# Patient Record
Sex: FEMALE | Race: WHITE | NOT HISPANIC OR LATINO | Employment: OTHER | URBAN - METROPOLITAN AREA
[De-identification: names, ages, dates, MRNs, and addresses within clinical notes are randomized per-mention and may not be internally consistent; named-entity substitution may affect disease eponyms.]

---

## 2017-09-01 ENCOUNTER — HOSPITAL ENCOUNTER (INPATIENT)
Facility: MEDICAL CENTER | Age: 32
LOS: 3 days | DRG: 778 | End: 2017-09-04
Attending: MEDICAL GENETICS | Admitting: MEDICAL GENETICS
Payer: MEDICAID

## 2017-09-01 LAB
ABO GROUP BLD: NORMAL
ALBUMIN SERPL BCP-MCNC: 2.7 G/DL (ref 3.2–4.9)
ALBUMIN/GLOB SERPL: 1.1 G/DL
ALP SERPL-CCNC: 65 U/L (ref 30–99)
ALT SERPL-CCNC: 5 U/L (ref 2–50)
AMPHET UR QL SCN: NEGATIVE
ANION GAP SERPL CALC-SCNC: 8 MMOL/L (ref 0–11.9)
AST SERPL-CCNC: 14 U/L (ref 12–45)
BARBITURATES UR QL SCN: NEGATIVE
BARCODED ABORH UBTYP: 5100
BARCODED ABORH UBTYP: 5100
BARCODED PRD CODE UBPRD: NORMAL
BARCODED PRD CODE UBPRD: NORMAL
BARCODED UNIT NUM UBUNT: NORMAL
BARCODED UNIT NUM UBUNT: NORMAL
BENZODIAZ UR QL SCN: NEGATIVE
BILIRUB SERPL-MCNC: 0.4 MG/DL (ref 0.1–1.5)
BLD GP AB SCN SERPL QL: NORMAL
BUN SERPL-MCNC: 3 MG/DL (ref 8–22)
BZE UR QL SCN: NEGATIVE
CALCIUM SERPL-MCNC: 7.7 MG/DL (ref 8.5–10.5)
CANNABINOIDS UR QL SCN: NEGATIVE
CHLORIDE SERPL-SCNC: 108 MMOL/L (ref 96–112)
CO2 SERPL-SCNC: 21 MMOL/L (ref 20–33)
COMPONENT R 8504R: NORMAL
CREAT SERPL-MCNC: 0.4 MG/DL (ref 0.5–1.4)
ERYTHROCYTE [DISTWIDTH] IN BLOOD BY AUTOMATED COUNT: 42.6 FL (ref 35.9–50)
GFR SERPL CREATININE-BSD FRML MDRD: >60 ML/MIN/1.73 M 2
GLOBULIN SER CALC-MCNC: 2.5 G/DL (ref 1.9–3.5)
GLUCOSE SERPL-MCNC: 86 MG/DL (ref 65–99)
HCT VFR BLD AUTO: 27.5 % (ref 37–47)
HGB BLD-MCNC: 9.2 G/DL (ref 12–16)
MAGNESIUM SERPL-MCNC: 3.5 MG/DL (ref 1.5–2.5)
MCH RBC QN AUTO: 32.2 PG (ref 27–33)
MCHC RBC AUTO-ENTMCNC: 33.5 G/DL (ref 33.6–35)
MCV RBC AUTO: 96.2 FL (ref 81.4–97.8)
METHADONE UR QL SCN: NEGATIVE
OPIATES UR QL SCN: NEGATIVE
OXYCODONE UR QL SCN: NEGATIVE
PCP UR QL SCN: NEGATIVE
PLATELET # BLD AUTO: 145 K/UL (ref 164–446)
PMV BLD AUTO: 11.7 FL (ref 9–12.9)
POTASSIUM SERPL-SCNC: 3.6 MMOL/L (ref 3.6–5.5)
PRODUCT TYPE UPROD: NORMAL
PRODUCT TYPE UPROD: NORMAL
PROPOXYPH UR QL SCN: NEGATIVE
PROT SERPL-MCNC: 5.2 G/DL (ref 6–8.2)
RBC # BLD AUTO: 2.86 M/UL (ref 4.2–5.4)
RH BLD: NORMAL
SODIUM SERPL-SCNC: 137 MMOL/L (ref 135–145)
UNIT STATUS USTAT: NORMAL
UNIT STATUS USTAT: NORMAL
WBC # BLD AUTO: 11 K/UL (ref 4.8–10.8)

## 2017-09-01 PROCEDURE — 80053 COMPREHEN METABOLIC PANEL: CPT

## 2017-09-01 PROCEDURE — 302790 HCHG STAT ANTEPARTUM CARE, DAILY

## 2017-09-01 PROCEDURE — 36415 COLL VENOUS BLD VENIPUNCTURE: CPT

## 2017-09-01 PROCEDURE — A9270 NON-COVERED ITEM OR SERVICE: HCPCS | Performed by: MEDICAL GENETICS

## 2017-09-01 PROCEDURE — 770002 HCHG ROOM/CARE - OB PRIVATE (112)

## 2017-09-01 PROCEDURE — 80307 DRUG TEST PRSMV CHEM ANLYZR: CPT

## 2017-09-01 PROCEDURE — 85027 COMPLETE CBC AUTOMATED: CPT

## 2017-09-01 PROCEDURE — 86850 RBC ANTIBODY SCREEN: CPT

## 2017-09-01 PROCEDURE — 86900 BLOOD TYPING SEROLOGIC ABO: CPT

## 2017-09-01 PROCEDURE — 83735 ASSAY OF MAGNESIUM: CPT

## 2017-09-01 PROCEDURE — 700105 HCHG RX REV CODE 258: Performed by: MEDICAL GENETICS

## 2017-09-01 PROCEDURE — 700102 HCHG RX REV CODE 250 W/ 637 OVERRIDE(OP): Performed by: MEDICAL GENETICS

## 2017-09-01 PROCEDURE — 700111 HCHG RX REV CODE 636 W/ 250 OVERRIDE (IP): Performed by: MEDICAL GENETICS

## 2017-09-01 PROCEDURE — 81001 URINALYSIS AUTO W/SCOPE: CPT

## 2017-09-01 PROCEDURE — 4A1HXCZ MONITORING OF PRODUCTS OF CONCEPTION, CARDIAC RATE, EXTERNAL APPROACH: ICD-10-PCS | Performed by: MEDICAL GENETICS

## 2017-09-01 PROCEDURE — 86901 BLOOD TYPING SEROLOGIC RH(D): CPT

## 2017-09-01 RX ORDER — ACETAMINOPHEN 325 MG/1
650 TABLET ORAL EVERY 4 HOURS PRN
Status: DISCONTINUED | OUTPATIENT
Start: 2017-09-01 | End: 2017-09-04 | Stop reason: HOSPADM

## 2017-09-01 RX ORDER — OXYCODONE AND ACETAMINOPHEN 10; 325 MG/1; MG/1
1 TABLET ORAL EVERY 4 HOURS PRN
Status: DISCONTINUED | OUTPATIENT
Start: 2017-09-01 | End: 2017-09-04 | Stop reason: HOSPADM

## 2017-09-01 RX ORDER — DOCUSATE SODIUM 100 MG/1
100 CAPSULE, LIQUID FILLED ORAL 2 TIMES DAILY PRN
Status: DISCONTINUED | OUTPATIENT
Start: 2017-09-01 | End: 2017-09-04 | Stop reason: HOSPADM

## 2017-09-01 RX ORDER — NICOTINE 21 MG/24HR
1 PATCH, TRANSDERMAL 24 HOURS TRANSDERMAL DAILY
Status: DISCONTINUED | OUTPATIENT
Start: 2017-09-01 | End: 2017-09-04 | Stop reason: HOSPADM

## 2017-09-01 RX ORDER — ONDANSETRON 2 MG/ML
4 INJECTION INTRAMUSCULAR; INTRAVENOUS EVERY 4 HOURS PRN
Status: DISCONTINUED | OUTPATIENT
Start: 2017-09-01 | End: 2017-09-04 | Stop reason: HOSPADM

## 2017-09-01 RX ORDER — SODIUM CHLORIDE, SODIUM LACTATE, POTASSIUM CHLORIDE, CALCIUM CHLORIDE 600; 310; 30; 20 MG/100ML; MG/100ML; MG/100ML; MG/100ML
INJECTION, SOLUTION INTRAVENOUS CONTINUOUS
Status: DISCONTINUED | OUTPATIENT
Start: 2017-09-01 | End: 2017-09-03

## 2017-09-01 RX ORDER — AMPICILLIN 2 G/1
2 INJECTION, POWDER, FOR SOLUTION INTRAVENOUS EVERY 6 HOURS
Status: DISCONTINUED | OUTPATIENT
Start: 2017-09-02 | End: 2017-09-03

## 2017-09-01 RX ORDER — MAGNESIUM SULFATE HEPTAHYDRATE 40 MG/ML
3 INJECTION, SOLUTION INTRAVENOUS CONTINUOUS
Status: DISCONTINUED | OUTPATIENT
Start: 2017-09-01 | End: 2017-09-02

## 2017-09-01 RX ADMIN — NICOTINE 1 PATCH: 21 PATCH, EXTENDED RELEASE TRANSDERMAL at 21:26

## 2017-09-01 RX ADMIN — OXYCODONE HYDROCHLORIDE AND ACETAMINOPHEN 1 TABLET: 10; 325 TABLET ORAL at 21:26

## 2017-09-01 RX ADMIN — SODIUM CHLORIDE, POTASSIUM CHLORIDE, SODIUM LACTATE AND CALCIUM CHLORIDE: 600; 310; 30; 20 INJECTION, SOLUTION INTRAVENOUS at 20:24

## 2017-09-01 RX ADMIN — MAGNESIUM SULFATE IN WATER 3 G/HR: 40 INJECTION, SOLUTION INTRAVENOUS at 20:23

## 2017-09-02 ENCOUNTER — APPOINTMENT (OUTPATIENT)
Dept: RADIOLOGY | Facility: MEDICAL CENTER | Age: 32
DRG: 778 | End: 2017-09-02
Attending: MEDICAL GENETICS
Payer: MEDICAID

## 2017-09-02 LAB
ABO GROUP BLD: NORMAL
APPEARANCE UR: CLEAR
APTT PPP: 27.4 SEC (ref 24.7–36)
BACTERIA #/AREA URNS HPF: NEGATIVE /HPF
BASOPHILS # BLD AUTO: 0.1 % (ref 0–1.8)
BASOPHILS # BLD: 0.01 K/UL (ref 0–0.12)
BILIRUB UR QL STRIP.AUTO: NEGATIVE
COLOR UR: ABNORMAL
EKG IMPRESSION: NORMAL
EOSINOPHIL # BLD AUTO: 0 K/UL (ref 0–0.51)
EOSINOPHIL NFR BLD: 0 % (ref 0–6.9)
EPI CELLS #/AREA URNS HPF: ABNORMAL /HPF
ERYTHROCYTE [DISTWIDTH] IN BLOOD BY AUTOMATED COUNT: 41.5 FL (ref 35.9–50)
GLUCOSE UR STRIP.AUTO-MCNC: NEGATIVE MG/DL
HCT VFR BLD AUTO: 24.7 % (ref 37–47)
HGB BLD-MCNC: 8.6 G/DL (ref 12–16)
HYALINE CASTS #/AREA URNS LPF: ABNORMAL /LPF
IMM GRANULOCYTES # BLD AUTO: 0.07 K/UL (ref 0–0.11)
IMM GRANULOCYTES NFR BLD AUTO: 0.7 % (ref 0–0.9)
INR PPP: 0.99 (ref 0.87–1.13)
KETONES UR STRIP.AUTO-MCNC: 100 MG/DL
LEUKOCYTE ESTERASE UR QL STRIP.AUTO: NEGATIVE
LYMPHOCYTES # BLD AUTO: 0.55 K/UL (ref 1–4.8)
LYMPHOCYTES NFR BLD: 5.4 % (ref 22–41)
MAGNESIUM SERPL-MCNC: 5.2 MG/DL (ref 1.5–2.5)
MCH RBC QN AUTO: 33.1 PG (ref 27–33)
MCHC RBC AUTO-ENTMCNC: 34.8 G/DL (ref 33.6–35)
MCV RBC AUTO: 95 FL (ref 81.4–97.8)
MICRO URNS: ABNORMAL
MONOCYTES # BLD AUTO: 0.43 K/UL (ref 0–0.85)
MONOCYTES NFR BLD AUTO: 4.2 % (ref 0–13.4)
NEUTROPHILS # BLD AUTO: 9.16 K/UL (ref 2–7.15)
NEUTROPHILS NFR BLD: 89.6 % (ref 44–72)
NITRITE UR QL STRIP.AUTO: NEGATIVE
NRBC # BLD AUTO: 0 K/UL
NRBC BLD AUTO-RTO: 0 /100 WBC
PH UR STRIP.AUTO: 6 [PH]
PLATELET # BLD AUTO: 153 K/UL (ref 164–446)
PMV BLD AUTO: 11.4 FL (ref 9–12.9)
PROT UR QL STRIP: NEGATIVE MG/DL
PROTHROMBIN TIME: 13.4 SEC (ref 12–14.6)
RBC # BLD AUTO: 2.6 M/UL (ref 4.2–5.4)
RBC # URNS HPF: ABNORMAL /HPF
RBC UR QL AUTO: ABNORMAL
SP GR UR STRIP.AUTO: 1.01
UROBILINOGEN UR STRIP.AUTO-MCNC: NORMAL MG/DL
WBC # BLD AUTO: 10.2 K/UL (ref 4.8–10.8)
WBC #/AREA URNS HPF: ABNORMAL /HPF

## 2017-09-02 PROCEDURE — 71010 DX-CHEST-LIMITED (1 VIEW): CPT

## 2017-09-02 PROCEDURE — 93010 ELECTROCARDIOGRAM REPORT: CPT | Performed by: INTERNAL MEDICINE

## 2017-09-02 PROCEDURE — 770002 HCHG ROOM/CARE - OB PRIVATE (112)

## 2017-09-02 PROCEDURE — 85025 COMPLETE CBC W/AUTO DIFF WBC: CPT

## 2017-09-02 PROCEDURE — 36415 COLL VENOUS BLD VENIPUNCTURE: CPT

## 2017-09-02 PROCEDURE — 83735 ASSAY OF MAGNESIUM: CPT

## 2017-09-02 PROCEDURE — P9016 RBC LEUKOCYTES REDUCED: HCPCS

## 2017-09-02 PROCEDURE — 93005 ELECTROCARDIOGRAM TRACING: CPT | Performed by: MEDICAL GENETICS

## 2017-09-02 PROCEDURE — 59025 FETAL NON-STRESS TEST: CPT | Performed by: MEDICAL GENETICS

## 2017-09-02 PROCEDURE — 85610 PROTHROMBIN TIME: CPT

## 2017-09-02 PROCEDURE — 700111 HCHG RX REV CODE 636 W/ 250 OVERRIDE (IP): Performed by: MEDICAL GENETICS

## 2017-09-02 PROCEDURE — 700105 HCHG RX REV CODE 258

## 2017-09-02 PROCEDURE — 700102 HCHG RX REV CODE 250 W/ 637 OVERRIDE(OP): Performed by: MEDICAL GENETICS

## 2017-09-02 PROCEDURE — 85730 THROMBOPLASTIN TIME PARTIAL: CPT

## 2017-09-02 PROCEDURE — 302790 HCHG STAT ANTEPARTUM CARE, DAILY

## 2017-09-02 PROCEDURE — 700105 HCHG RX REV CODE 258: Performed by: MEDICAL GENETICS

## 2017-09-02 PROCEDURE — 30233N1 TRANSFUSION OF NONAUTOLOGOUS RED BLOOD CELLS INTO PERIPHERAL VEIN, PERCUTANEOUS APPROACH: ICD-10-PCS | Performed by: MEDICAL GENETICS

## 2017-09-02 PROCEDURE — 36430 TRANSFUSION BLD/BLD COMPNT: CPT

## 2017-09-02 PROCEDURE — A9270 NON-COVERED ITEM OR SERVICE: HCPCS | Performed by: MEDICAL GENETICS

## 2017-09-02 PROCEDURE — 86923 COMPATIBILITY TEST ELECTRIC: CPT | Mod: 91

## 2017-09-02 PROCEDURE — 86644 CMV ANTIBODY: CPT | Mod: 91

## 2017-09-02 RX ORDER — BETAMETHASONE SODIUM PHOSPHATE AND BETAMETHASONE ACETATE 3; 3 MG/ML; MG/ML
12 INJECTION, SUSPENSION INTRA-ARTICULAR; INTRALESIONAL; INTRAMUSCULAR; SOFT TISSUE ONCE
Status: COMPLETED | OUTPATIENT
Start: 2017-09-02 | End: 2017-09-02

## 2017-09-02 RX ORDER — SODIUM CHLORIDE 9 MG/ML
INJECTION, SOLUTION INTRAVENOUS CONTINUOUS
Status: DISCONTINUED | OUTPATIENT
Start: 2017-09-02 | End: 2017-09-03

## 2017-09-02 RX ORDER — DIPHENHYDRAMINE HYDROCHLORIDE 50 MG/ML
25 INJECTION INTRAMUSCULAR; INTRAVENOUS 2 TIMES DAILY PRN
Status: DISCONTINUED | OUTPATIENT
Start: 2017-09-02 | End: 2017-09-04 | Stop reason: HOSPADM

## 2017-09-02 RX ORDER — SODIUM CHLORIDE 9 MG/ML
INJECTION, SOLUTION INTRAVENOUS
Status: COMPLETED
Start: 2017-09-02 | End: 2017-09-02

## 2017-09-02 RX ORDER — LORAZEPAM 1 MG/1
0.5 TABLET ORAL ONCE
Status: COMPLETED | OUTPATIENT
Start: 2017-09-02 | End: 2017-09-02

## 2017-09-02 RX ORDER — LORAZEPAM 2 MG/ML
1 INJECTION INTRAMUSCULAR ONCE
Status: DISCONTINUED | OUTPATIENT
Start: 2017-09-02 | End: 2017-09-02

## 2017-09-02 RX ADMIN — AMPICILLIN SODIUM 2 G: 2 INJECTION, POWDER, FOR SOLUTION INTRAVENOUS at 11:57

## 2017-09-02 RX ADMIN — DIPHENHYDRAMINE HYDROCHLORIDE 25 MG: 50 INJECTION, SOLUTION INTRAMUSCULAR; INTRAVENOUS at 13:48

## 2017-09-02 RX ADMIN — LORAZEPAM 0.5 MG: 1 TABLET ORAL at 19:34

## 2017-09-02 RX ADMIN — AMPICILLIN SODIUM 2 G: 2 INJECTION, POWDER, FOR SOLUTION INTRAVENOUS at 02:34

## 2017-09-02 RX ADMIN — ACETAMINOPHEN 650 MG: 325 TABLET, FILM COATED ORAL at 13:47

## 2017-09-02 RX ADMIN — SODIUM CHLORIDE: 9 INJECTION, SOLUTION INTRAVENOUS at 22:58

## 2017-09-02 RX ADMIN — AMPICILLIN SODIUM 2 G: 2 INJECTION, POWDER, FOR SOLUTION INTRAVENOUS at 17:56

## 2017-09-02 RX ADMIN — BETAMETHASONE SODIUM PHOSPHATE AND BETAMETHASONE ACETATE 12 MG: 3; 3 INJECTION, SUSPENSION INTRA-ARTICULAR; INTRALESIONAL; INTRAMUSCULAR at 19:24

## 2017-09-02 RX ADMIN — ACETAMINOPHEN 650 MG: 325 TABLET, FILM COATED ORAL at 18:21

## 2017-09-02 RX ADMIN — DIPHENHYDRAMINE HYDROCHLORIDE 25 MG: 50 INJECTION, SOLUTION INTRAMUSCULAR; INTRAVENOUS at 22:58

## 2017-09-02 RX ADMIN — OXYCODONE HYDROCHLORIDE AND ACETAMINOPHEN 1 TABLET: 10; 325 TABLET ORAL at 21:24

## 2017-09-02 RX ADMIN — AMPICILLIN SODIUM 2 G: 2 INJECTION, POWDER, FOR SOLUTION INTRAVENOUS at 06:26

## 2017-09-02 RX ADMIN — SODIUM CHLORIDE 1000 ML: 9 INJECTION, SOLUTION INTRAVENOUS at 14:00

## 2017-09-02 RX ADMIN — SODIUM CHLORIDE, POTASSIUM CHLORIDE, SODIUM LACTATE AND CALCIUM CHLORIDE: 600; 310; 30; 20 INJECTION, SOLUTION INTRAVENOUS at 13:23

## 2017-09-02 ASSESSMENT — LIFESTYLE VARIABLES
DO YOU DRINK ALCOHOL: NO
EVER_SMOKED: YES
DO YOU DRINK ALCOHOL: NO
ALCOHOL_USE: NO

## 2017-09-02 ASSESSMENT — PAIN SCALES - GENERAL
PAINLEVEL_OUTOF10: 0

## 2017-09-02 ASSESSMENT — PATIENT HEALTH QUESTIONNAIRE - PHQ9
SUM OF ALL RESPONSES TO PHQ9 QUESTIONS 1 AND 2: 0
1. LITTLE INTEREST OR PLEASURE IN DOING THINGS: NOT AT ALL
2. FEELING DOWN, DEPRESSED, IRRITABLE, OR HOPELESS: NOT AT ALL
SUM OF ALL RESPONSES TO PHQ QUESTIONS 1-9: 0
SUM OF ALL RESPONSES TO PHQ9 QUESTIONS 1 AND 2: 0
SUM OF ALL RESPONSES TO PHQ QUESTIONS 1-9: 0
2. FEELING DOWN, DEPRESSED, IRRITABLE, OR HOPELESS: NOT AT ALL
1. LITTLE INTEREST OR PLEASURE IN DOING THINGS: NOT AT ALL

## 2017-09-02 ASSESSMENT — COPD QUESTIONNAIRES
HAVE YOU SMOKED AT LEAST 100 CIGARETTES IN YOUR ENTIRE LIFE: NO/DON'T KNOW
DO YOU EVER COUGH UP ANY MUCUS OR PHLEGM?: NO/ONLY WITH OCCASIONAL COLDS OR INFECTIONS
COPD SCREENING SCORE: 0
DURING THE PAST 4 WEEKS HOW MUCH DID YOU FEEL SHORT OF BREATH: NONE/LITTLE OF THE TIME

## 2017-09-02 NOTE — PROGRESS NOTES
PAtient seen and evaluated  S: feels better than 24 hours ago (decreased uterine pain)  Occasional contractions only  No vaginal bleed  Good fetal movement  O: AF VSS  80-95/41-52  BPP 8/8  Doppler of UA normal (S/D 2.5)  Doppler of MCA normal: S/D: 39.6 cm/sec (near 1.0 MoM for GA)    Labile hypotension continues with hct now 24.7%  Plt: 153k    Have elected to transfuse 2 units (by protocol)   Decision made for safety issues:  If emergent management (eg delivery) necessary elevated hct would be useful    Have explained management decision to patient who agrees with plan and who understands risks/benefits

## 2017-09-02 NOTE — PROGRESS NOTES
1954- EDC 11/15/2017 39.3 weeks transferred via care flight from [place] for PTL. TOCO/FM applied. Pt's SBP dropped to mid 70's after receiving 6g mag bolus. 3 liters of fluid given during flight. BP's still remain low in 80's.  -Dr. Foley at bedside to see pt. US completed and SVE=1. Magnesium turned down to 2g do to low BP's  -pt in a lot of pain. Medicated with 10mg percocet  -pt feeling much better after pain meds given.  500-Dr. Foley called by RN and updated on pt's low BP's even after receiving 1 1/2 liters bolus through out night per orders. Orders rcvd to DC mag and continue with LR at 125  0600-pt remains comfortably without pain.  0700-report given to dayshift RN

## 2017-09-02 NOTE — PROGRESS NOTES
" [facility] - 11/15/17 EGA - 29.3    0700 - Report received from Josie Hamlin RN. POC discussed, care assumed. Full Assessment complete. VSS. No complaints of contractions, ROM, or vaginal bleeding at this time. Pt reports good FM. POC discussed with pt and family members, all questions answered. Patient on continuous monitoring at this time. Category 1 FHT at this time.  09 Dr. Foley at bedside for status update, orders to remove EFM, continuous toco. EFM removed. Orders for patient to be on a regular diet at this time.  1315 Dr. Foley at bedside for ultrasound. BPP  Orders received to transfuse 2 units PRBCs .   1400 Consents signed for blood transfusion, blood transfusion started, RN at bedside continuously, No reactions for the first 15 minutes. Patients VSS.   1515 Patient sleeping at this time.  1754 Blood transfusion complete at this time. VSS.   1821 Patient feeling short of breath, Dr. Foley contacted, orders received to hold blood.   1850 Patient states she feels \"Like I cant take a deep breath like an elephant is sitting on my chest.\" Dr. Foley updated, orders received for a stat EKG and MD to come to bedside.   Report given to Josie Hamlin RN  190 Dr. Foley at bedside, orders received for a chest x-ray. Orders received.  "

## 2017-09-02 NOTE — PROGRESS NOTES
Patient seen and evaluated  S: quiet nite  Slept well  No headache or dizziness  No to rare contractions  No bleed  O: AF VSS  1213/54-94/51  H/H: 9.2/27.5  Plt: 145k  Urine tox: negative  Coag: normal  FH: 130-140 with rare short variables  A: 29 3/7 weeks  PTL  Stable on no tocolysis (d/c'd with labile hypotension)  CPM

## 2017-09-02 NOTE — CARE PLAN
Problem: Skin Integrity  Goal: Skin Integrity is maintained or improved    Intervention: Davion Flowsheet  Davion assessment preformed q shift.      Problem: Risk for injury  Goal: Fetus will be free of preventable trauma or other complications    Intervention: Monitor FHR electronically, obtaining a reactive NST every shift or as ordered by MD  FHT NST q shift, continuous TOCO

## 2017-09-02 NOTE — H&P
DATE OF ADMISSION:  2017    HISTORY OF PRESENT ILLNESS:  The patient is a 32-year-old  4, para 3,   white female accepted in transport from Dr. Marrero in [place] with a diagnosis   of  labor.  The patient was well until the morning of 2017 when   after her toddler fell on her abdomen she noted abdominal pain.  This became   associated with contractions. The patient denied vaginal bleeding and leaking   of fluid.  The patient presented to [place] for disposition and the patient was   found to be jam, and with a combination of nifedipine and   terbutaline, contractions ceased.  Despite that and because of a cervical   change to 1 cm, the patient was started on magnesium sulfate therapy and was   given ampicillin 2 g IV q. 6 as well as betamethasone administration.    The patient arrived and was placed in room #234.  Iverson catheter was draining   clear urine.    The patient's obstetric history is remarkable for 1  delivery   delivering at approximately 30-32 weeks.  Subsequent pregnancies were full   term and were uncomplicated and were accompanied with the use of [place]   injections, which was not used during this pregnancy for financial reasons.    The patient's pregnancy was unremarkable until this point.    Normal ultrasounds by report.    PAST MEDICAL HISTORY:  The patient denies a history of hypertension, diabetes,   or heart, lung, or kidney disease.    SOCIAL HISTORY:  She admits to smoking half a pack of cigarettes per day.    ALLERGIES:  She was without allergies to medications.    MEDICATIONS:  The patient's only medication was prenatal vitamins.    PAST SURGICAL HISTORY:  Remarkable only for an ovarian cystectomy years ago.    REVIEW OF SYSTEMS:  Noncontributory.    PHYSICAL EXAMINATION:  GENERAL:  Reveals a well-developed, well-nourished white female in no acute   distress.  VITAL SIGNS:  Blood pressure 106/54, respiratory rate of 14.  The patient is    afebrile.  HEENT:  Atraumatic, normocephalic.  Extraocular movements intact.  Pupils   equal, round, reactive to light and accommodation.  NECK:  Supple without thyromegaly or jugular venous distention.  CHEST:  Clear to auscultation and percussion.  CARDIAC:  Regular rate and rhythm.  S1 and S2 heard.  No murmurs or rubs   appreciated.  ABDOMEN:  Soft and nontender.  EXTREMITIES:  Deep tendon reflexes are 2+ and equal bilaterally.  EXTREMITIES:  Without cyanosis, clubbing, and edema.    DIAGNOSTIC STUDIES:  Ultrasound performed by me revealed a single intrauterine   pregnancy in a vertex presentation with an estimated fetal weight of 1337 g   (2 pounds 15 ounces) at the 49th percentile for gestational age.  Amniotic   fluid index was normal with an KAREN of 15.5.  The patient's middle cerebral   artery Doppler was appropriate for gestational age and umbilical artery   Doppler was normal as well.    Ultrasound also revealed a placenta, which was posterior fundal with no   retroplacental or anteroplacental clots.  No suggestion of abruption was made.    [place] shows no contractions and fetal heart rate tracing is reactive.    Biophysical profile  _____.    ASSESSMENT:  Intrauterine pregnancy at a well-dated 29-2/7 weeks with a fetus   appropriate for gestational age and cervical dilatation to 1 cm.  The   diagnosis of  labor is confirmed and the patient will be managed per    labor protocol with special attention and consideration regarding   clotting abnormalities and abruption consideration, although no other   indications appear that way or suggest that diagnosis.  The patient and I had   a discussion regarding management and the patient agrees with this current   management course:  This will be explained to family shortly.       ____________________________________     Allan Foley MD    RNS / NTS    DD:  2017 21:24:37  DT:  2017 21:58:19    D#:  [id number]  Job#:  [id number]    cc:  DOREEN CABRERA MD

## 2017-09-03 LAB
BASOPHILS # BLD AUTO: 0 % (ref 0–1.8)
BASOPHILS # BLD AUTO: 0.2 % (ref 0–1.8)
BASOPHILS # BLD: 0 K/UL (ref 0–0.12)
BASOPHILS # BLD: 0.02 K/UL (ref 0–0.12)
EOSINOPHIL # BLD AUTO: 0 K/UL (ref 0–0.51)
EOSINOPHIL # BLD AUTO: 0 K/UL (ref 0–0.51)
EOSINOPHIL NFR BLD: 0 % (ref 0–6.9)
EOSINOPHIL NFR BLD: 0 % (ref 0–6.9)
ERYTHROCYTE [DISTWIDTH] IN BLOOD BY AUTOMATED COUNT: 49.7 FL (ref 35.9–50)
ERYTHROCYTE [DISTWIDTH] IN BLOOD BY AUTOMATED COUNT: 52.7 FL (ref 35.9–50)
HCT VFR BLD AUTO: 29 % (ref 37–47)
HCT VFR BLD AUTO: 30.2 % (ref 37–47)
HGB BLD-MCNC: 10 G/DL (ref 12–16)
HGB BLD-MCNC: 10 G/DL (ref 12–16)
IMM GRANULOCYTES # BLD AUTO: 0.11 K/UL (ref 0–0.11)
IMM GRANULOCYTES # BLD AUTO: 0.14 K/UL (ref 0–0.11)
IMM GRANULOCYTES NFR BLD AUTO: 1.1 % (ref 0–0.9)
IMM GRANULOCYTES NFR BLD AUTO: 1.3 % (ref 0–0.9)
LYMPHOCYTES # BLD AUTO: 0.55 K/UL (ref 1–4.8)
LYMPHOCYTES # BLD AUTO: 0.79 K/UL (ref 1–4.8)
LYMPHOCYTES NFR BLD: 5.3 % (ref 22–41)
LYMPHOCYTES NFR BLD: 7.7 % (ref 22–41)
MCH RBC QN AUTO: 31.3 PG (ref 27–33)
MCH RBC QN AUTO: 32.1 PG (ref 27–33)
MCHC RBC AUTO-ENTMCNC: 33.1 G/DL (ref 33.6–35)
MCHC RBC AUTO-ENTMCNC: 34.5 G/DL (ref 33.6–35)
MCV RBC AUTO: 92.9 FL (ref 81.4–97.8)
MCV RBC AUTO: 94.7 FL (ref 81.4–97.8)
MONOCYTES # BLD AUTO: 0.37 K/UL (ref 0–0.85)
MONOCYTES # BLD AUTO: 0.5 K/UL (ref 0–0.85)
MONOCYTES NFR BLD AUTO: 3.6 % (ref 0–13.4)
MONOCYTES NFR BLD AUTO: 4.9 % (ref 0–13.4)
NEUTROPHILS # BLD AUTO: 8.87 K/UL (ref 2–7.15)
NEUTROPHILS # BLD AUTO: 9.35 K/UL (ref 2–7.15)
NEUTROPHILS NFR BLD: 86.1 % (ref 44–72)
NEUTROPHILS NFR BLD: 89.8 % (ref 44–72)
NRBC # BLD AUTO: 0 K/UL
NRBC # BLD AUTO: 0 K/UL
NRBC BLD AUTO-RTO: 0 /100 WBC
NRBC BLD AUTO-RTO: 0 /100 WBC
PLATELET # BLD AUTO: 148 K/UL (ref 164–446)
PLATELET # BLD AUTO: 156 K/UL (ref 164–446)
PMV BLD AUTO: 11.6 FL (ref 9–12.9)
PMV BLD AUTO: 12.1 FL (ref 9–12.9)
RBC # BLD AUTO: 3.12 M/UL (ref 4.2–5.4)
RBC # BLD AUTO: 3.19 M/UL (ref 4.2–5.4)
WBC # BLD AUTO: 10.3 K/UL (ref 4.8–10.8)
WBC # BLD AUTO: 10.4 K/UL (ref 4.8–10.8)

## 2017-09-03 PROCEDURE — 36415 COLL VENOUS BLD VENIPUNCTURE: CPT

## 2017-09-03 PROCEDURE — 700105 HCHG RX REV CODE 258: Performed by: MEDICAL GENETICS

## 2017-09-03 PROCEDURE — A9270 NON-COVERED ITEM OR SERVICE: HCPCS | Performed by: MEDICAL GENETICS

## 2017-09-03 PROCEDURE — 700111 HCHG RX REV CODE 636 W/ 250 OVERRIDE (IP): Performed by: MEDICAL GENETICS

## 2017-09-03 PROCEDURE — 770002 HCHG ROOM/CARE - OB PRIVATE (112)

## 2017-09-03 PROCEDURE — 700102 HCHG RX REV CODE 250 W/ 637 OVERRIDE(OP): Performed by: MEDICAL GENETICS

## 2017-09-03 PROCEDURE — 85025 COMPLETE CBC W/AUTO DIFF WBC: CPT

## 2017-09-03 PROCEDURE — 302790 HCHG STAT ANTEPARTUM CARE, DAILY

## 2017-09-03 RX ADMIN — AMPICILLIN SODIUM 2 G: 2 INJECTION, POWDER, FOR SOLUTION INTRAVENOUS at 06:28

## 2017-09-03 RX ADMIN — AMPICILLIN SODIUM 2 G: 2 INJECTION, POWDER, FOR SOLUTION INTRAVENOUS at 01:03

## 2017-09-03 RX ADMIN — NICOTINE 1 PATCH: 21 PATCH, EXTENDED RELEASE TRANSDERMAL at 10:58

## 2017-09-03 RX ADMIN — SODIUM CHLORIDE, POTASSIUM CHLORIDE, SODIUM LACTATE AND CALCIUM CHLORIDE: 600; 310; 30; 20 INJECTION, SOLUTION INTRAVENOUS at 06:28

## 2017-09-03 ASSESSMENT — PAIN SCALES - GENERAL
PAINLEVEL_OUTOF10: 0

## 2017-09-03 NOTE — PROGRESS NOTES
Called to see patient for acute onset shortness of breath    Patient had had 1st unit of blood transfusion almost completed when she noted mild/moderate shortness of breath  RN reported clear lung fields   O2 sat >98% on room air  EKG without acute changes  Urine clear without signs of hemolysis  Confirmation of clear lungs by me    2nd unit held for now  Chest x ray ordered

## 2017-09-03 NOTE — PROGRESS NOTES
"1900-rcvd report from paul RN and assumed care of pt.  Dr. Foley in department to see pt after pt c/o having SOB and \"feeling like an elephant was sitting on her chest\". Stat EKG was completed and a portable chest x-ray ordered.  1915-chest x-ray completed and negative per Dr. Foley. Orders rcvd to give 0.5mg of Ativan to pt PO for anxiety and give the 2nd unit of blood at 8264-0758 if pt continues to feel ok.  2300-2nd blood transfusion started.   0145-blood transfusion complete without incidence. Pt sleeping comfortably at this time. BP's remain low throughout transfusion.  "

## 2017-09-03 NOTE — PROGRESS NOTES
0623- Report received care assumed; patient sleeping in bed - no family at BS; patient is expecting visitors sometime today. Reports good sleep last night, denies contractions/discomfort denies ill feeling, reports frequent FM, no ROM no Bleeding, No change to vision/edema/HA; states she has been getting up to the BR herself without assist denies dizziness or weakness. Gestation today of 29.4  weeks, FM/[place] to be placed after pt wakes up and completes AM care, discussed POC, Cheerful affect/mood, denies questions/concerns at this point, states understanding of POC/expectiations Patient encouraged to call RN with all questions/concerns/needs. RN contact information written on the white information board.

## 2017-09-03 NOTE — CARE PLAN
Problem: Psychosocial needs  Goal: Spiritual needs incorporated in hospitalization    Intervention: Encourage verbalization of feelings/concerns/expectations  Pt encouraged to verbalize feelings and frustrations with all the social stress she has going on outside the hospital.      Problem: Pain  Goal: Alleviation of Pain or a reduction in pain to the patient's comfort goal    Intervention: Pain Management--Medications  Pt medicated for back pain with good relief.

## 2017-09-04 VITALS
OXYGEN SATURATION: 94 % | HEIGHT: 61 IN | HEART RATE: 69 BPM | BODY MASS INDEX: 24.35 KG/M2 | SYSTOLIC BLOOD PRESSURE: 99 MMHG | TEMPERATURE: 97.6 F | DIASTOLIC BLOOD PRESSURE: 54 MMHG | RESPIRATION RATE: 16 BRPM | WEIGHT: 129 LBS

## 2017-09-04 PROBLEM — O60.03 PRETERM LABOR IN THIRD TRIMESTER WITHOUT DELIVERY: Status: ACTIVE | Noted: 2017-09-04

## 2017-09-04 PROCEDURE — A9270 NON-COVERED ITEM OR SERVICE: HCPCS | Performed by: MEDICAL GENETICS

## 2017-09-04 PROCEDURE — 700111 HCHG RX REV CODE 636 W/ 250 OVERRIDE (IP): Performed by: MEDICAL GENETICS

## 2017-09-04 PROCEDURE — 700102 HCHG RX REV CODE 250 W/ 637 OVERRIDE(OP): Performed by: MEDICAL GENETICS

## 2017-09-04 PROCEDURE — 59025 FETAL NON-STRESS TEST: CPT | Performed by: MEDICAL GENETICS

## 2017-09-04 RX ADMIN — NICOTINE 1 PATCH: 21 PATCH, EXTENDED RELEASE TRANSDERMAL at 06:16

## 2017-09-04 RX ADMIN — DIPHENHYDRAMINE HYDROCHLORIDE 25 MG: 50 INJECTION, SOLUTION INTRAMUSCULAR; INTRAVENOUS at 02:00

## 2017-09-04 ASSESSMENT — COPD QUESTIONNAIRES
DO YOU EVER COUGH UP ANY MUCUS OR PHLEGM?: NO/ONLY WITH OCCASIONAL COLDS OR INFECTIONS
COPD SCREENING SCORE: 0
DURING THE PAST 4 WEEKS HOW MUCH DID YOU FEEL SHORT OF BREATH: NONE/LITTLE OF THE TIME
HAVE YOU SMOKED AT LEAST 100 CIGARETTES IN YOUR ENTIRE LIFE: NO/DON'T KNOW

## 2017-09-04 ASSESSMENT — PATIENT HEALTH QUESTIONNAIRE - PHQ9
SUM OF ALL RESPONSES TO PHQ QUESTIONS 1-9: 0
1. LITTLE INTEREST OR PLEASURE IN DOING THINGS: NOT AT ALL
SUM OF ALL RESPONSES TO PHQ9 QUESTIONS 1 AND 2: 0

## 2017-09-04 ASSESSMENT — PAIN SCALES - GENERAL: PAINLEVEL_OUTOF10: 0

## 2017-09-04 ASSESSMENT — LIFESTYLE VARIABLES: DO YOU DRINK ALCOHOL: NO

## 2017-09-04 NOTE — PROGRESS NOTES
Received report and assumed care of patient from OSEI Pereira. Patient is resting comfortably at this time. POC reviewed, questions answered, needs met at this time.  0700 Report given to ULI Olmedo and assumed care of patient.

## 2017-09-04 NOTE — PROGRESS NOTES
Good day  Pt happy to have freedman out  and iv heplocked. Pt steady on feet no dizzyness or cramping. Baby moving well pt settled in for the night  May take a shower if she wants Report given to Noc shift at 1900 pt care to cont.

## 2017-09-04 NOTE — PROGRESS NOTES
0700- Report received from Magy DOWNEY- poc discussed  0730- pt resting no c/o pain, bleeding, LOf, uc's, +FM, pt wondering if she will be able to go home  0915- Dr. Foley by, cecilia ft/50 orders given to discharge home and pt follow up with Dr. Mock this week  0930- nst complete, pt given discharge instructions, pt verbalized understanding waiting for her ride  1015- pt discharged home with family

## 2017-09-04 NOTE — PROGRESS NOTES
Patient seen and evaluated  S: no complaints  No contractions  No bleed  O: AF VSS   /54-55  NST pend  Post transfusion h/h: 10.0/30.2  Plt: 148k  SVE: FT/50%/mid    A: 29 5/7 weeks  Stable post transfusion    OK for discharge  F/U with Dr Mock this week

## 2017-09-04 NOTE — CARE PLAN
Problem: Infection  Goal: Will remain free from infection  Outcome: PROGRESSING AS EXPECTED  Monitor S&S of infection such as fever    Problem: Pain  Goal: Alleviation of Pain or a reduction in pain to the patient's comfort goal  Outcome: PROGRESSING AS EXPECTED  Pain assessment, medication as needed

## 2017-09-04 NOTE — DISCHARGE SUMMARY
DATE OF ADMISSION:  2017    DATE OF DISCHARGE:  2017    The patient was admitted in transport from [facility] with a diagnosis of    labor on 2017.    At the time of admission, the patient was a 32-year-old  4, para 3, who   presented to labor and delivery in [place] with contractions, abdominal pain   and a perception of cervical change.  The patient was seen in [facility]   where a standard tocolysis was begun.  The patient was noted to be anemic and   was transfused 2 units of packed red blood cells without complication.  The   patient was also noted to be in labile hypotension, which largely resolved at   the conclusion of her stay.  The patient's cervical exam at discharge is   fingertip, 50% effaced in mid position.  Patient was given  labor   precautions with explicit directions to decrease activity and remain at pelvic   rest.  The patient was instructed to be seen by Dr. Mock during the next   few days.   labor precautions were reinforced.    CONDITION ON DISCHARGE:  Good.    MEDICATIONS AT DISCHARGE:  None.       ____________________________________     Allan Foley MD    RNS / NTS    DD:  2017 09:10:59  DT:  2017 09:24:42    D#:  [id number]  Job#:  [id number]    cc: DOREEN MOCK MD

## 2017-11-05 ENCOUNTER — HOSPITAL ENCOUNTER (OUTPATIENT)
Facility: MEDICAL CENTER | Age: 32
End: 2017-11-06
Attending: OBSTETRICS & GYNECOLOGY | Admitting: OBSTETRICS & GYNECOLOGY
Payer: MEDICAID

## 2017-11-05 VITALS — HEART RATE: 96 BPM | DIASTOLIC BLOOD PRESSURE: 73 MMHG | SYSTOLIC BLOOD PRESSURE: 113 MMHG

## 2017-11-05 LAB — CRYSTALS AMN MICRO: NORMAL

## 2017-11-05 PROCEDURE — 59025 FETAL NON-STRESS TEST: CPT | Performed by: NURSE PRACTITIONER

## 2017-11-05 PROCEDURE — 86780 TREPONEMA PALLIDUM: CPT

## 2017-11-05 PROCEDURE — 87340 HEPATITIS B SURFACE AG IA: CPT

## 2017-11-05 PROCEDURE — 87653 STREP B DNA AMP PROBE: CPT

## 2017-11-05 PROCEDURE — 85025 COMPLETE CBC W/AUTO DIFF WBC: CPT

## 2017-11-05 PROCEDURE — 86762 RUBELLA ANTIBODY: CPT

## 2017-11-05 PROCEDURE — 87389 HIV-1 AG W/HIV-1&-2 AB AG IA: CPT

## 2017-11-05 PROCEDURE — 86803 HEPATITIS C AB TEST: CPT

## 2017-11-05 PROCEDURE — 36415 COLL VENOUS BLD VENIPUNCTURE: CPT

## 2017-11-05 PROCEDURE — 89060 EXAM SYNOVIAL FLUID CRYSTALS: CPT

## 2017-11-06 LAB
ABO GROUP BLD: NORMAL
BASOPHILS # BLD AUTO: 0.4 % (ref 0–1.8)
BASOPHILS # BLD: 0.05 K/UL (ref 0–0.12)
BLD GP AB SCN SERPL QL: NORMAL
EOSINOPHIL # BLD AUTO: 0.05 K/UL (ref 0–0.51)
EOSINOPHIL NFR BLD: 0.4 % (ref 0–6.9)
ERYTHROCYTE [DISTWIDTH] IN BLOOD BY AUTOMATED COUNT: 44.8 FL (ref 35.9–50)
HBV SURFACE AG SER QL: NEGATIVE
HCT VFR BLD AUTO: 32.9 % (ref 37–47)
HCV AB SER QL: NEGATIVE
HGB BLD-MCNC: 11.2 G/DL (ref 12–16)
HIV 1+2 AB+HIV1 P24 AG SERPL QL IA: NON REACTIVE
IMM GRANULOCYTES # BLD AUTO: 0.1 K/UL (ref 0–0.11)
IMM GRANULOCYTES NFR BLD AUTO: 0.8 % (ref 0–0.9)
LYMPHOCYTES # BLD AUTO: 2.41 K/UL (ref 1–4.8)
LYMPHOCYTES NFR BLD: 19.9 % (ref 22–41)
MCH RBC QN AUTO: 31.6 PG (ref 27–33)
MCHC RBC AUTO-ENTMCNC: 34 G/DL (ref 33.6–35)
MCV RBC AUTO: 92.9 FL (ref 81.4–97.8)
MONOCYTES # BLD AUTO: 0.74 K/UL (ref 0–0.85)
MONOCYTES NFR BLD AUTO: 6.1 % (ref 0–13.4)
NEUTROPHILS # BLD AUTO: 8.76 K/UL (ref 2–7.15)
NEUTROPHILS NFR BLD: 72.4 % (ref 44–72)
NRBC # BLD AUTO: 0 K/UL
NRBC BLD AUTO-RTO: 0 /100 WBC
PLATELET # BLD AUTO: 242 K/UL (ref 164–446)
PMV BLD AUTO: 11.5 FL (ref 9–12.9)
RBC # BLD AUTO: 3.54 M/UL (ref 4.2–5.4)
RH BLD: NORMAL
RUBV AB SER QL: 12.8 IU/ML
TREPONEMA PALLIDUM IGG+IGM AB [PRESENCE] IN SERUM OR PLASMA BY IMMUNOASSAY: NON REACTIVE
WBC # BLD AUTO: 12.1 K/UL (ref 4.8–10.8)

## 2017-11-06 PROCEDURE — 86901 BLOOD TYPING SEROLOGIC RH(D): CPT

## 2017-11-06 PROCEDURE — 86850 RBC ANTIBODY SCREEN: CPT

## 2017-11-06 PROCEDURE — 86900 BLOOD TYPING SEROLOGIC ABO: CPT

## 2017-11-06 NOTE — PROGRESS NOTES
Pt into triage c/o having some irregular sharp pains around her belly and that her back hurts here and there.  Pt reports that sometimes it feels like urine is just leaking out of her or something else, she doesn't really know because none of her other pregnancies were like this.  Monitors placed and pt has no report of bleeding.  POC discussed and pt states understanding at this time.  2110  SVE with fern obtained, no pooling and perineum is dry, dilation noted at this time and discussion for recheck in 1 hour.  2245  Fern returned negative.  Repeat SVE at this time with no cervical change.  Report to CHRISTIN Malhotra and orders for continued monitoring with recheck.  Pt reports that she feels like the UC's are stronger now.  Prenatal labs ordered and GBS obtained at this time.  2345  Repeat SVE with no cervical change made.  Report to CHRISTIN Malhotra and orders for DC received.  DC instructions given and pt states understanding at this time to return when the UC's are getting stronger or if her water breaks.  Pt states understanding and into her regular clothing at this time.  0000  Pt to home with family member.

## 2017-11-06 NOTE — PROGRESS NOTES
S: Pt is a 32 y.o.  at 38w4d with Estimated Date of Delivery: 11/15/17. who presents to triage c/o UC's and ?LOF.  Denies VB.  Reports good FM.      O: /73   Pulse 96          FHTs: baseline 130, pos accels, neg decels, mod variability       [place]: irregular UCs, every 2-8 min       SVE: 4/80/-2, no change after monitoring for >2 hours  Fern was collected and negative. RN saw no gross pooling, negative valsalva.    Last prenatal visit was 17 with her OB in [place], then was transferred to [facility] for evaluation of PTL on 17. Received betamethasone and was subsequently discharged on 17 back to the care of Dr Mock in [place]. She however never followed up, and has had no prenatal care since then.   Prenatal labs were drawn today and GBS collected tonight.  Labor precautions discussed. Encouraged follow up ASAP.         A/P  Patient Active Problem List    Diagnosis Date Noted   •  labor in third trimester without delivery 2017   • Anemia of mother in pregnancy, postpartum condition 2017       1.  IUP @ Unknown  2.  Reactive, category 1 NST.  3.  Early labor, BOW intact.  4.  Lapse in prenatal care  5.  F/u at [facility] for labor or other problems.    Jasmina Malhotra CNM, APRN

## 2017-11-07 LAB — GP B STREP DNA SPEC QL NAA+PROBE: NEGATIVE

## 2018-01-05 ENCOUNTER — OFFICE VISIT (OUTPATIENT)
Dept: URGENT CARE | Facility: PHYSICIAN GROUP | Age: 33
End: 2018-01-05
Payer: MEDICAID

## 2018-01-05 VITALS
TEMPERATURE: 98.8 F | DIASTOLIC BLOOD PRESSURE: 74 MMHG | WEIGHT: 110.6 LBS | OXYGEN SATURATION: 98 % | HEIGHT: 61 IN | SYSTOLIC BLOOD PRESSURE: 112 MMHG | RESPIRATION RATE: 20 BRPM | BODY MASS INDEX: 20.88 KG/M2 | HEART RATE: 133 BPM

## 2018-01-05 DIAGNOSIS — R30.0 DYSURIA: ICD-10-CM

## 2018-01-05 DIAGNOSIS — J02.9 SORE THROAT: ICD-10-CM

## 2018-01-05 DIAGNOSIS — J10.1 INFLUENZA A: ICD-10-CM

## 2018-01-05 DIAGNOSIS — J02.0 STREP THROAT: ICD-10-CM

## 2018-01-05 LAB
APPEARANCE UR: NORMAL
BILIRUB UR STRIP-MCNC: NEGATIVE MG/DL
COLOR UR AUTO: NORMAL
FLUAV+FLUBV AG SPEC QL IA: NORMAL
GLUCOSE UR STRIP.AUTO-MCNC: NEGATIVE MG/DL
INT CON NEG: NORMAL
INT CON NEG: NORMAL
INT CON POS: NORMAL
INT CON POS: NORMAL
KETONES UR STRIP.AUTO-MCNC: NEGATIVE MG/DL
LEUKOCYTE ESTERASE UR QL STRIP.AUTO: NEGATIVE
NITRITE UR QL STRIP.AUTO: NEGATIVE
PH UR STRIP.AUTO: 6 [PH] (ref 5–8)
PROT UR QL STRIP: NORMAL MG/DL
RBC UR QL AUTO: NORMAL
S PYO AG THROAT QL: POSITIVE
SP GR UR STRIP.AUTO: 1.03
UROBILINOGEN UR STRIP-MCNC: NEGATIVE MG/DL

## 2018-01-05 PROCEDURE — 87804 INFLUENZA ASSAY W/OPTIC: CPT | Performed by: EMERGENCY MEDICINE

## 2018-01-05 PROCEDURE — 99203 OFFICE O/P NEW LOW 30 MIN: CPT | Mod: 25 | Performed by: EMERGENCY MEDICINE

## 2018-01-05 PROCEDURE — 87880 STREP A ASSAY W/OPTIC: CPT | Performed by: EMERGENCY MEDICINE

## 2018-01-05 PROCEDURE — 81002 URINALYSIS NONAUTO W/O SCOPE: CPT | Performed by: EMERGENCY MEDICINE

## 2018-01-05 RX ORDER — AMOXICILLIN 500 MG/1
500 CAPSULE ORAL 2 TIMES DAILY
Qty: 20 CAP | Refills: 0 | Status: SHIPPED | OUTPATIENT
Start: 2018-01-05 | End: 2018-01-15

## 2018-01-05 RX ORDER — OSELTAMIVIR PHOSPHATE 75 MG/1
75 CAPSULE ORAL 2 TIMES DAILY
Qty: 10 CAP | Refills: 0 | Status: SHIPPED | OUTPATIENT
Start: 2018-01-05 | End: 2023-09-15

## 2018-01-05 ASSESSMENT — ENCOUNTER SYMPTOMS
FLANK PAIN: 0
ABDOMINAL PAIN: 0
SORE THROAT: 1
VOMITING: 0
NAUSEA: 0
DIARRHEA: 0
RHINORRHEA: 0
SENSORY CHANGE: 0
COUGH: 1
SINUS PAIN: 1
FOCAL WEAKNESS: 0
WHEEZING: 0
NECK PAIN: 0
CHILLS: 1
HEADACHES: 1

## 2018-01-05 NOTE — PROGRESS NOTES
"Subjective:      Stephanie Fontaine is a 32 y.o. female who presents with Pharyngitis (x 2 days)            URI    This is a new problem. Episode onset: 2 days. The problem has been gradually worsening. Maximum temperature: subjective. Associated symptoms include congestion, coughing, dysuria, headaches, sinus pain and a sore throat. Pertinent negatives include no abdominal pain, chest pain, diarrhea, ear pain, nausea, neck pain, plugged ear sensation, rash, rhinorrhea, vomiting or wheezing. Treatments tried: Mucinex. The treatment provided no relief.   Postpartum one month, delivery complicated by delayed placental delivery. Not breast-feeding.  Brief episode of dysuria yesterday.    Review of Systems   Constitutional: Positive for chills and malaise/fatigue.   HENT: Positive for congestion, sinus pain and sore throat. Negative for ear pain, nosebleeds and rhinorrhea.    Respiratory: Positive for cough. Negative for wheezing.    Cardiovascular: Negative for chest pain.   Gastrointestinal: Negative for abdominal pain, diarrhea, nausea and vomiting.   Genitourinary: Positive for dysuria. Negative for flank pain, frequency, hematuria and urgency.        No vaginal discharge; continued minimal vaginal bleeding.   Musculoskeletal: Negative for neck pain.   Skin: Negative for rash.   Neurological: Positive for headaches. Negative for sensory change and focal weakness.     PMH:  has no past medical history on file.  MEDS: No current outpatient prescriptions on file.  ALLERGIES: No Known Allergies  SURGHX: History reviewed. No pertinent surgical history.  SOCHX:  reports that she has been smoking Cigarettes.  She has been smoking about 0.50 packs per day. She has never used smokeless tobacco. She reports that she does not drink alcohol or use drugs.  FH: family history is not on file.       Objective:     /74   Pulse (!) 133   Temp 37.1 °C (98.8 °F)   Resp 20   Ht 1.549 m (5' 1\")   Wt 50.2 kg (110 lb 9.6 oz)   SpO2 " 98%   BMI 20.90 kg/m²      Physical Exam   Constitutional: She is oriented to person, place, and time. She appears well-developed and well-nourished. She is cooperative.  Non-toxic appearance. She does not have a sickly appearance. No distress.   HENT:   Right Ear: Tympanic membrane and ear canal normal.   Left Ear: Tympanic membrane and ear canal normal.   Nose: Mucosal edema present. No rhinorrhea.   Mouth/Throat: Uvula is midline. No trismus in the jaw. No uvula swelling. Posterior oropharyngeal erythema present. No oropharyngeal exudate or posterior oropharyngeal edema. Tonsils are 2+ on the right. Tonsils are 2+ on the left. Tonsillar exudate.   Eyes: Conjunctivae are normal.   Neck: Trachea normal and phonation normal. Neck supple.   Cardiovascular: Regular rhythm and normal heart sounds.  Tachycardia present.    No murmur heard.  Pulmonary/Chest: Effort normal and breath sounds normal.   Abdominal: She exhibits no distension.   Lymphadenopathy:     She has cervical adenopathy.        Right cervical: Superficial cervical adenopathy present. No posterior cervical adenopathy present.       Left cervical: Superficial cervical adenopathy present. No posterior cervical adenopathy present.   Neurological: She is alert and oriented to person, place, and time.   Skin: Skin is warm and dry.   Psychiatric: She has a normal mood and affect.          Advised mother of availability of Tamiflu, risks versus benefits; patient prefers to take it.     Assessment/Plan:     1. Strep throat  Rx amoxicillin  Recommended supportive care measures, including rest, increasing oral fluid intake and use of over-the-counter medications for relief of symptoms.  Advised smoking cessation or avoidance.  2. Influenza A  Positive A- POCT Influenza A/B  Rx [place]  3. Sore throat  positive- POCT Rapid Strep A  4. Dysuria  Positive blood, SG 1.030 - POCT Urinalysis  Encouraged increased oral fluid intake

## 2019-08-20 NOTE — PROGRESS NOTES
-- Message is from the [facility]--    Reason for Call: Patent father called a states patient was treated by doctor regarding patient geel states still hasn't cleared up . Please call to discuss next step    Caller Information       Type Contact Phone    08/20/2019 01:01 PM Phone (Incoming) LEONELA JEAN (Father) [phone]          Alternative phone number:     Turnaround time given to caller:   \"This message will be sent to [state Provider's name]. The clinical team will fulfill your request as soon as they review your message.\"     Patient seen and evaluated  S: Sleeping  quiet nite per RN  Slept well  No headache or dizziness  No to rare contractions  No bleed  O: AF VSS  105/47 pulse 64  H/H: 10.0/29.0 post transfusion  Plt: 156k    NST pend  A: 29 4/7 weeks  PTL  Stable on no tocolysis   CPM

## 2020-04-04 ENCOUNTER — OFFICE VISIT (OUTPATIENT)
Dept: URGENT CARE | Facility: PHYSICIAN GROUP | Age: 35
End: 2020-04-04
Payer: MEDICAID

## 2020-04-04 VITALS
OXYGEN SATURATION: 97 % | TEMPERATURE: 97.7 F | WEIGHT: 97 LBS | RESPIRATION RATE: 16 BRPM | DIASTOLIC BLOOD PRESSURE: 62 MMHG | SYSTOLIC BLOOD PRESSURE: 106 MMHG | BODY MASS INDEX: 18.33 KG/M2 | HEART RATE: 102 BPM

## 2020-04-04 DIAGNOSIS — R30.0 DYSURIA: ICD-10-CM

## 2020-04-04 DIAGNOSIS — N30.01 ACUTE CYSTITIS WITH HEMATURIA: ICD-10-CM

## 2020-04-04 LAB
APPEARANCE UR: NORMAL
BILIRUB UR STRIP-MCNC: NORMAL MG/DL
COLOR UR AUTO: NORMAL
GLUCOSE UR STRIP.AUTO-MCNC: NORMAL MG/DL
INT CON NEG: NORMAL
INT CON POS: NORMAL
KETONES UR STRIP.AUTO-MCNC: NORMAL MG/DL
LEUKOCYTE ESTERASE UR QL STRIP.AUTO: NORMAL
NITRITE UR QL STRIP.AUTO: NORMAL
PH UR STRIP.AUTO: 5 [PH] (ref 5–8)
POC URINE PREGNANCY TEST: NEGATIVE
PROT UR QL STRIP: NORMAL MG/DL
RBC UR QL AUTO: NORMAL
SP GR UR STRIP.AUTO: 1.02
UROBILINOGEN UR STRIP-MCNC: 4 MG/DL

## 2020-04-04 PROCEDURE — 99214 OFFICE O/P EST MOD 30 MIN: CPT | Mod: 25 | Performed by: PHYSICIAN ASSISTANT

## 2020-04-04 PROCEDURE — 81025 URINE PREGNANCY TEST: CPT | Performed by: PHYSICIAN ASSISTANT

## 2020-04-04 PROCEDURE — 81002 URINALYSIS NONAUTO W/O SCOPE: CPT | Performed by: PHYSICIAN ASSISTANT

## 2020-04-04 RX ORDER — SULFAMETHOXAZOLE AND TRIMETHOPRIM 800; 160 MG/1; MG/1
1 TABLET ORAL EVERY 12 HOURS
Qty: 28 TAB | Refills: 0 | Status: CANCELLED | OUTPATIENT
Start: 2020-04-04 | End: 2020-04-18

## 2020-04-04 RX ORDER — ONDANSETRON 4 MG/1
4 TABLET, FILM COATED ORAL EVERY 6 HOURS PRN
Qty: 20 TAB | Refills: 1 | Status: SHIPPED | OUTPATIENT
Start: 2020-04-04 | End: 2023-09-15

## 2020-04-04 RX ORDER — SULFAMETHOXAZOLE AND TRIMETHOPRIM 800; 160 MG/1; MG/1
1 TABLET ORAL EVERY 12 HOURS
Qty: 14 TAB | Refills: 0 | Status: SHIPPED | OUTPATIENT
Start: 2020-04-04 | End: 2020-04-11

## 2020-04-04 ASSESSMENT — ENCOUNTER SYMPTOMS
COUGH: 0
ABDOMINAL PAIN: 1
FLANK PAIN: 0
DIARRHEA: 0
CHILLS: 0
NAUSEA: 0
VOMITING: 0
FEVER: 0

## 2020-04-04 NOTE — PROGRESS NOTES
Subjective:   Stephanie Fontaine  is a 35 y.o. female who presents for Painful Urination (burning x2d)        Other   This is a new problem. The current episode started yesterday. The problem occurs constantly. Associated symptoms include abdominal pain ( Suprapubic cramping). Pertinent negatives include no chills, coughing, fever, nausea, rash or vomiting.     Patient was clinic complaining of dysuria frequency urgency and hematuria.  Denies fevers chills but complains of body aches.  Complains of abdominal cramping but denies back pain.  Denies any past medical history of cystitis or pyelonephritis.  Denies abnormal vaginal discharge.  Notes she did have intercourse just prior to onset of symptoms and suspects involved.  Complains of nausea without vomiting.    Review of Systems   Constitutional: Negative for chills and fever.   Respiratory: Negative for cough.    Gastrointestinal: Positive for abdominal pain ( Suprapubic cramping). Negative for diarrhea, nausea and vomiting.   Genitourinary: Positive for dysuria, frequency, hematuria and urgency. Negative for flank pain.   Skin: Negative for rash.     No Known Allergies   I have worn an N95 mask, gloves and eye protection for the entire encounter with this patient.    Objective:   /62   Pulse (!) 102   Temp 36.5 °C (97.7 °F) (Temporal)   Resp 16   Wt 44 kg (97 lb)   SpO2 97%   BMI 18.33 kg/m²   Physical Exam  Vitals signs and nursing note reviewed.   Constitutional:       General: She is not in acute distress.     Appearance: Normal appearance. She is well-developed. She is not diaphoretic.   HENT:      Head: Normocephalic and atraumatic.      Right Ear: External ear normal.      Left Ear: External ear normal.      Nose: Nose normal.   Eyes:      General: Lids are normal. No scleral icterus.        Right eye: No discharge.         Left eye: No discharge.      Conjunctiva/sclera: Conjunctivae normal.   Neck:      Musculoskeletal: Neck supple.   Pulmonary:       Effort: Pulmonary effort is normal. No respiratory distress.   Abdominal:      Palpations: Abdomen is soft. Abdomen is not rigid.      Tenderness: There is abdominal tenderness ( very mild suprapubic) in the suprapubic area. There is no guarding.   Musculoskeletal: Normal range of motion.   Skin:     General: Skin is warm and dry.      Coloration: Skin is not pale.      Findings: No erythema.   Neurological:      Mental Status: She is alert and oriented to person, place, and time. She is not disoriented.   Psychiatric:         Speech: Speech normal.         Behavior: Behavior normal.     POCT UA -+++        Rocephin 250 IM -tolerates well    Assessment/Plan:   1. Acute cystitis with hematuria  - POCT Urinalysis  - POCT PREGNANCY  - cefTRIAXone (ROCEPHIN) 250 mg, lidocaine (XYLOCAINE) 1 % 0.9 mL for IM use  - ondansetron (ZOFRAN) 4 MG Tab tablet; Take 1 Tab by mouth every 6 hours as needed for Nausea/Vomiting.  Dispense: 20 Tab; Refill: 1  - sulfamethoxazole-trimethoprim (BACTRIM DS) 800-160 MG tablet; Take 1 Tab by mouth every 12 hours for 7 days.  Dispense: 14 Tab; Refill: 0    2. Dysuria  Supportive care is reviewed with patient/caregiver - recommend to push PO fluids and electrolytes, nsaids/tylenol, rest, full course of abx, probiotics w/ abx, azo/cran, observe for resolution  ER precautions with any worsening symptoms are reviewed with patient/caregiver and they do express understanding    Return to clinic with lack of resolution or progression of symptoms.    Differential diagnosis, natural history, supportive care, and indications for immediate follow-up discussed.

## 2023-09-15 ENCOUNTER — OFFICE VISIT (OUTPATIENT)
Dept: URGENT CARE | Facility: PHYSICIAN GROUP | Age: 38
End: 2023-09-15
Payer: MEDICAID

## 2023-09-15 VITALS
WEIGHT: 112 LBS | DIASTOLIC BLOOD PRESSURE: 52 MMHG | SYSTOLIC BLOOD PRESSURE: 98 MMHG | HEART RATE: 82 BPM | BODY MASS INDEX: 21.14 KG/M2 | HEIGHT: 61 IN | OXYGEN SATURATION: 98 % | RESPIRATION RATE: 20 BRPM | TEMPERATURE: 98.3 F

## 2023-09-15 DIAGNOSIS — J02.0 ACUTE STREPTOCOCCAL PHARYNGITIS: ICD-10-CM

## 2023-09-15 PROCEDURE — 99203 OFFICE O/P NEW LOW 30 MIN: CPT | Performed by: FAMILY MEDICINE

## 2023-09-15 PROCEDURE — 3078F DIAST BP <80 MM HG: CPT | Performed by: FAMILY MEDICINE

## 2023-09-15 PROCEDURE — 3074F SYST BP LT 130 MM HG: CPT | Performed by: FAMILY MEDICINE

## 2023-09-15 RX ORDER — AMOXICILLIN 500 MG/1
CAPSULE ORAL
Qty: 20 CAPSULE | Refills: 0 | Status: SHIPPED | OUTPATIENT
Start: 2023-09-15 | End: 2023-09-25

## 2023-09-16 NOTE — PROGRESS NOTES
Chief Complaint:    Chief Complaint   Patient presents with    Pharyngitis     Daughter tested POS strep yesterday, has the same sx x 1 day     Body Aches    Fever    Chills    Headache    Otalgia       History of Present Illness:    Sore throat and body aches started last night. Some discomfort in ears and cough. Feels she has strep throat as cause of symptoms. Amoxil worked and was tolerated for previous strep throat treatment. Daughter was seen by other provider yesterday, had positive Cepheid PCR Strep test, negative Cepheid PCR Covid, Flu, and RSV test, was prescribed Amoxil for treatment, visit reviewed. Daughter has improved some today.      Past Medical History:    History reviewed. No pertinent past medical history.    Past Surgical History:    History reviewed. No pertinent surgical history.    Social History:    Social History     Socioeconomic History    Marital status: Single     Spouse name: Not on file    Number of children: Not on file    Years of education: Not on file    Highest education level: Not on file   Occupational History    Not on file   Tobacco Use    Smoking status: Every Day     Current packs/day: 0.50     Types: Cigarettes    Smokeless tobacco: Never   Substance and Sexual Activity    Alcohol use: No    Drug use: No    Sexual activity: Yes     Partners: Male   Other Topics Concern    Not on file   Social History Narrative    Not on file     Social Determinants of Health     Financial Resource Strain: Not on file   Food Insecurity: Not on file   Transportation Needs: Not on file   Physical Activity: Not on file   Stress: Not on file   Social Connections: Not on file   Intimate Partner Violence: Not on file   Housing Stability: Not on file     Family History:    History reviewed. No pertinent family history.    Medications:    No current outpatient medications on file prior to visit.     No current facility-administered medications on file prior to visit.     Allergies:    No Known  "Allergies      Vitals:    Vitals:    09/15/23 1748   BP: 98/52   Pulse: 82   Resp: 20   Temp: 36.8 °C (98.3 °F)   TempSrc: Temporal   SpO2: 98%   Weight: 50.8 kg (112 lb)   Height: 1.549 m (5' 1\")       Physical Exam:    Constitutional: Vital signs reviewed. Appears well-developed and well-nourished. Fatigued. No acute distress.   Eyes: Sclera white, conjunctivae clear.  ENT: External ears normal. Hearing normal. Lips/teeth are normal. Oral mucosa pink and moist. Posterior pharynx: mildly erythematous.  Neck: Neck supple.   Pulmonary/Chest: Respirations non-labored.   Musculoskeletal: Normal gait. No muscular atrophy or weakness.  Neurological: Alert and oriented to person, place, and time. Muscle tone normal. Coordination normal.   Skin: No rashes or lesions. Warm, dry, normal turgor.  Psychiatric: Normal mood and affect. Behavior is normal. Judgment and thought content normal.       Assessment / Plan & Medical Decision Makin. Acute streptococcal pharyngitis  - amoxicillin (AMOXIL) 500 MG Cap; 1 CAP BY MOUTH TWICE A DAY X 10 DAYS.  Dispense: 20 Capsule; Refill: 0       Discussed with her DDX, management options, and risks, benefits, and alternatives to treatment plan agreed upon.    Sore throat and body aches started last night. Some discomfort in ears and cough. Feels she has strep throat as cause of symptoms. Amoxil worked and was tolerated for previous strep throat treatment. Daughter was seen by other provider yesterday, had positive Cepheid PCR Strep test, negative Cepheid PCR Covid, Flu, and RSV test, was prescribed Amoxil for treatment, visit reviewed. Daughter has improved some today.    Fatigued. Posterior pharynx: mildly erythematous.    Discussed limitations of Strep test, including possible false negative.     Due to severity of symptoms and exposure do daughter with positive Cepheid PCR Strep test yesterday and improving some today with Amoxil treatment, recommended to treat with antibiotic " regardless of the outcome of any strep testing. She agrees and thus is OK about no strep test today.     May use over-the-counter Tylenol and/or Ibuprofen as needed for fever, body aches, and/or sore throat.     Agreeable to medication prescribed.    Discussed expected course of duration, time for improvement, and to seek follow-up in Emergency Room, urgent care, or with PCP if getting worse at any time or not improving within expected time frame.

## 2025-01-23 ENCOUNTER — OFFICE VISIT (OUTPATIENT)
Dept: URGENT CARE | Facility: PHYSICIAN GROUP | Age: 40
End: 2025-01-23
Payer: MEDICAID

## 2025-01-23 VITALS
TEMPERATURE: 98.2 F | SYSTOLIC BLOOD PRESSURE: 122 MMHG | RESPIRATION RATE: 20 BRPM | BODY MASS INDEX: 19.52 KG/M2 | OXYGEN SATURATION: 99 % | WEIGHT: 103.4 LBS | HEART RATE: 86 BPM | DIASTOLIC BLOOD PRESSURE: 64 MMHG | HEIGHT: 61 IN

## 2025-01-23 DIAGNOSIS — J06.9 VIRAL URI WITH COUGH: ICD-10-CM

## 2025-01-23 PROCEDURE — 3078F DIAST BP <80 MM HG: CPT | Performed by: REGISTERED NURSE

## 2025-01-23 PROCEDURE — 99213 OFFICE O/P EST LOW 20 MIN: CPT | Performed by: REGISTERED NURSE

## 2025-01-23 PROCEDURE — 3074F SYST BP LT 130 MM HG: CPT | Performed by: REGISTERED NURSE

## 2025-01-23 RX ORDER — DEXTROMETHORPHAN HYDROBROMIDE AND PROMETHAZINE HYDROCHLORIDE 15; 6.25 MG/5ML; MG/5ML
5 SYRUP ORAL EVERY 4 HOURS PRN
Qty: 120 ML | Refills: 0 | Status: SHIPPED | OUTPATIENT
Start: 2025-01-23

## 2025-01-23 ASSESSMENT — ENCOUNTER SYMPTOMS
SHORTNESS OF BREATH: 0
DIZZINESS: 0
ABDOMINAL PAIN: 0
FEVER: 0
CHILLS: 0

## 2025-01-23 NOTE — PROGRESS NOTES
"Subjective:   Stephanie Fontaine is a 40 y.o. female who presents for Cough (Sore throat since yesterday)      HPI  Cold symptoms x 2 days. No fevers. Exposed to family who is sick. No pertinent medical history. Tolerating PO. Using OTC medications.        Review of Systems   Constitutional:  Negative for chills and fever.   Respiratory:  Negative for shortness of breath.    Cardiovascular:  Negative for chest pain.   Gastrointestinal:  Negative for abdominal pain.   Skin:  Negative for rash.   Neurological:  Negative for dizziness.       No Known Allergies    Patient Active Problem List    Diagnosis Date Noted     labor in third trimester without delivery 2017    Anemia of mother in pregnancy, postpartum condition 2017       Current Outpatient Medications Ordered in Epic   Medication Sig Dispense Refill    promethazine-dextromethorphan (PROMETHAZINE-DM) 6.25-15 MG/5ML syrup Take 5 mL by mouth every four hours as needed for Cough. 120 mL 0     No current Epic-ordered facility-administered medications on file.       No past surgical history on file.    Social History     Tobacco Use    Smoking status: Every Day     Current packs/day: 0.50     Types: Cigarettes    Smokeless tobacco: Never   Substance Use Topics    Alcohol use: No    Drug use: No       family history is not on file.     Problem list, medications, and allergies reviewed by myself today in Epic.     Objective:   /64   Pulse 86   Temp 36.8 °C (98.2 °F) (Temporal)   Resp 20   Ht 1.549 m (5' 1\")   Wt 46.9 kg (103 lb 6.4 oz)   SpO2 99%   BMI 19.54 kg/m²     Physical Exam  Vitals and nursing note reviewed.   Constitutional:       General: She is not in acute distress.     Appearance: Normal appearance. She is well-developed. She is not ill-appearing, toxic-appearing or diaphoretic.   HENT:      Head: Normocephalic and atraumatic.      Right Ear: Tympanic membrane, ear canal and external ear normal.      Left Ear: Tympanic membrane, " ear canal and external ear normal.      Nose: Congestion and rhinorrhea present.      Mouth/Throat:      Mouth: Mucous membranes are moist.      Pharynx: Uvula midline. Postnasal drip present. No oropharyngeal exudate or posterior oropharyngeal erythema.   Eyes:      General:         Right eye: No discharge.         Left eye: No discharge.      Conjunctiva/sclera: Conjunctivae normal.   Cardiovascular:      Rate and Rhythm: Normal rate and regular rhythm.      Pulses: Normal pulses.      Heart sounds: Normal heart sounds.   Pulmonary:      Effort: Pulmonary effort is normal. No respiratory distress.      Breath sounds: Normal breath sounds. No wheezing, rhonchi or rales.   Musculoskeletal:      Cervical back: Normal range of motion and neck supple.      Right lower leg: No edema.      Left lower leg: No edema.   Lymphadenopathy:      Cervical: No cervical adenopathy.   Skin:     General: Skin is warm and dry.   Neurological:      General: No focal deficit present.      Mental Status: She is alert and oriented to person, place, and time. Mental status is at baseline.   Psychiatric:         Mood and Affect: Mood normal.         Behavior: Behavior normal.         Thought Content: Thought content normal.         Judgment: Judgment normal.         Assessment/Plan:     I personally reviewed prior external notes and test results pertinent to today's visit as well as additional imaging and testing completed in clinic today.    I introduced myself as Kevin Banegas a Nurse Practitioner.    1. Viral URI with cough  promethazine-dextromethorphan (PROMETHAZINE-DM) 6.25-15 MG/5ML syrup      TESTING: Declined  VITAL SIGNS: WNL  MDM/PLAN: No signs of distress.  Talking full sentences.  Does have congestion, rhinorrhea and postnasal drainage.  No adventitious lung sounds.  No rash or nuchal rigidity.  Overall, unremarkable exam w/o red flag signs or symptoms.  We did discuss likely viral etiology given exposure at school and also  presentation with family who has similar symptoms.  No evidence of bacterial infection at this time.    Promethazine DM for cough, precautions given  OTC cold medication  Pulmonary toilet  Recommend adequate hydration   Rest  Return precautions discussed    Medication discussed included indication for use and the potential benefits and side effects. The Patient was encouraged to monitor symptoms closely, and we reviewed the signs and symptoms that require a higher level of care through the emergency department. Patient verbalized understanding.    Please note that this dictation was created using voice recognition software. I have made every reasonable attempt to correct obvious errors, but I expect that there are errors of grammar and possibly content that I did not discover before finalizing the note.    This note was electronically signed by JAZMINE Hernandez